# Patient Record
Sex: FEMALE | Race: WHITE | URBAN - METROPOLITAN AREA
[De-identification: names, ages, dates, MRNs, and addresses within clinical notes are randomized per-mention and may not be internally consistent; named-entity substitution may affect disease eponyms.]

---

## 2017-02-14 ENCOUNTER — OFFICE VISIT (OUTPATIENT)
Dept: FAMILY MEDICINE CLINIC | Facility: CLINIC | Age: 57
End: 2017-02-14

## 2017-02-14 VITALS
SYSTOLIC BLOOD PRESSURE: 128 MMHG | RESPIRATION RATE: 22 BRPM | HEART RATE: 89 BPM | DIASTOLIC BLOOD PRESSURE: 74 MMHG | TEMPERATURE: 99 F

## 2017-02-14 DIAGNOSIS — M16.11 OSTEOARTHRITIS OF RIGHT HIP, UNSPECIFIED OSTEOARTHRITIS TYPE: ICD-10-CM

## 2017-02-14 DIAGNOSIS — G20 PARKINSON DISEASE (HCC): ICD-10-CM

## 2017-02-14 PROCEDURE — 99212 OFFICE O/P EST SF 10 MIN: CPT | Performed by: FAMILY MEDICINE

## 2017-02-14 PROCEDURE — 99202 OFFICE O/P NEW SF 15 MIN: CPT | Performed by: FAMILY MEDICINE

## 2017-02-14 RX ORDER — ALPRAZOLAM 0.25 MG/1
TABLET ORAL
Refills: 0 | COMMUNITY
Start: 2016-12-28 | End: 2017-02-14

## 2017-02-14 RX ORDER — CITALOPRAM 40 MG/1
TABLET ORAL
Refills: 2 | COMMUNITY
Start: 2017-01-24 | End: 2017-08-09

## 2017-02-14 RX ORDER — CLONAZEPAM 1 MG/1
1 TABLET ORAL NIGHTLY PRN
Refills: 0 | COMMUNITY
Start: 2016-12-28 | End: 2017-08-09

## 2017-02-14 RX ORDER — ALPRAZOLAM 0.25 MG/1
0.25 TABLET ORAL 2 TIMES DAILY PRN
Qty: 30 TABLET | Refills: 2 | Status: SHIPPED | OUTPATIENT
Start: 2017-02-14 | End: 2017-08-09

## 2017-02-14 RX ORDER — IBUPROFEN 800 MG/1
TABLET ORAL
Refills: 1 | COMMUNITY
Start: 2017-02-06 | End: 2018-08-24

## 2017-02-14 RX ORDER — RASAGILINE 1 MG/1
1 TABLET ORAL
COMMUNITY
End: 2017-08-09

## 2017-02-14 NOTE — PROGRESS NOTES
HPI:    Patient ID: Clarice Oneal is a 64year old female. HPI Comments: Patient is here to establish care. No acute issues but has hx of sig Parkinson's disease and also right hip arthritis. Pt has had sig pains and is on disability now.  Patient is reque encounter. Meds This Visit:  Signed Prescriptions Disp Refills    alprazolam 0.25 MG Oral Tab 30 tablet 2      Sig: Take 1 tablet (0.25 mg total) by mouth 2 (two) times daily as needed for Anxiety.            Imaging & Referrals:  ORTHOPEDIC - [facility]

## 2017-02-21 ENCOUNTER — HOSPITAL ENCOUNTER (OUTPATIENT)
Dept: GENERAL RADIOLOGY | Facility: HOSPITAL | Age: 57
Discharge: HOME | End: 2017-02-21
Attending: ORTHOPAEDIC SURGERY
Payer: COMMERCIAL

## 2017-02-21 ENCOUNTER — OFFICE VISIT (OUTPATIENT)
Dept: ORTHOPEDICS CLINIC | Facility: CLINIC | Age: 57
End: 2017-02-21

## 2017-02-21 DIAGNOSIS — M25.551 RIGHT HIP PAIN: ICD-10-CM

## 2017-02-21 DIAGNOSIS — M51.36 DDD (DEGENERATIVE DISC DISEASE), LUMBAR: ICD-10-CM

## 2017-02-21 DIAGNOSIS — M16.11 PRIMARY OSTEOARTHRITIS OF RIGHT HIP: Primary | ICD-10-CM

## 2017-02-21 PROCEDURE — 73502 X-RAY EXAM HIP UNI 2-3 VIEWS: CPT

## 2017-02-21 PROCEDURE — 72120 X-RAY BEND ONLY L-S SPINE: CPT

## 2017-02-21 PROCEDURE — 99212 OFFICE O/P EST SF 10 MIN: CPT | Performed by: ORTHOPAEDIC SURGERY

## 2017-02-21 PROCEDURE — 99243 OFF/OP CNSLTJ NEW/EST LOW 30: CPT | Performed by: ORTHOPAEDIC SURGERY

## 2017-02-21 RX ORDER — DICLOFENAC SODIUM 75 MG/1
75 TABLET, DELAYED RELEASE ORAL 2 TIMES DAILY PRN
Qty: 60 TABLET | Refills: 0 | Status: SHIPPED | OUTPATIENT
Start: 2017-02-21 | End: 2017-04-04

## 2017-02-22 NOTE — PROGRESS NOTES
2/21/2017  Dane Henry  106/1960  64year old   female  Krishna Lara MD    HPI:   Patient presents with:  Hip Pain: Right - onset 2014 when she started to have pain in her R groin - she has Parkinson disease - she was Dx with osteoarthritis in the R Never Smoker                      Alcohol Use: No                    REVIEW OF SYSTEMS:   A 12 point review of systems was performed as documented on the intake form and reviewed by me today with pertinent positives and negatives listed in the HPI.     EXAM The patient was offered physical therapy for the lumbar spine but she declined. The patient will be given a prescription for Voltaren to use as an anti-inflammatory. Patient will follow-up only as needed.     All of their questions were answered and they

## 2017-04-04 RX ORDER — DICLOFENAC SODIUM 75 MG/1
75 TABLET, DELAYED RELEASE ORAL 2 TIMES DAILY PRN
Qty: 60 TABLET | Refills: 0 | Status: SHIPPED
Start: 2017-04-04 | End: 2017-08-09

## 2017-04-04 NOTE — TELEPHONE ENCOUNTER
From: Rocco Wolff  To: Agus Gill., MD  Sent: 3/30/2017 2:33 PM CDT  Subject: Medication Renewal Request    Original authorizing provider: MD Rocco Sepulveda would like a refill of the following medications:  Diclofenac Sodium 75

## 2017-04-10 ENCOUNTER — TELEPHONE (OUTPATIENT)
Dept: ORTHOPEDICS CLINIC | Facility: CLINIC | Age: 57
End: 2017-04-10

## 2017-06-15 ENCOUNTER — TELEPHONE (OUTPATIENT)
Dept: ORTHOPEDICS CLINIC | Facility: CLINIC | Age: 57
End: 2017-06-15

## 2017-06-15 NOTE — TELEPHONE ENCOUNTER
rc'd fax from pharm requesting refill of diclofenac 75mg BID prn #60. LOV 2/21/17. Last refill 3/30/17.  Please advise

## 2017-06-16 NOTE — TELEPHONE ENCOUNTER
Call to [facility]. No answer. Left detailed message. Explained for longer term usage of antinflamatory Dr. Juan Ramon Burkett preferrrs the monitoring of this to be with the primary care physician. Diclofenac 75mg  REquested call back for any questions.

## 2017-08-07 ENCOUNTER — TELEPHONE (OUTPATIENT)
Dept: NEUROLOGY | Facility: CLINIC | Age: 57
End: 2017-08-07

## 2017-08-09 ENCOUNTER — OFFICE VISIT (OUTPATIENT)
Dept: NEUROLOGY | Facility: CLINIC | Age: 57
End: 2017-08-09

## 2017-08-09 VITALS
BODY MASS INDEX: 36.65 KG/M2 | WEIGHT: 220 LBS | RESPIRATION RATE: 16 BRPM | SYSTOLIC BLOOD PRESSURE: 110 MMHG | DIASTOLIC BLOOD PRESSURE: 74 MMHG | HEIGHT: 65 IN | HEART RATE: 80 BPM

## 2017-08-09 DIAGNOSIS — G20 PARKINSON'S DISEASE (HCC): Primary | ICD-10-CM

## 2017-08-09 PROCEDURE — 99244 OFF/OP CNSLTJ NEW/EST MOD 40: CPT | Performed by: OTHER

## 2017-08-09 RX ORDER — CITALOPRAM 40 MG/1
TABLET ORAL
Qty: 90 TABLET | Refills: 2 | Status: SHIPPED | OUTPATIENT
Start: 2017-08-09 | End: 2018-05-02

## 2017-08-09 RX ORDER — CLONAZEPAM 1 MG/1
0.5 TABLET ORAL NIGHTLY PRN
Qty: 15 TABLET | Refills: 0 | Status: SHIPPED | OUTPATIENT
Start: 2017-08-09 | End: 2017-09-26

## 2017-08-09 RX ORDER — GABAPENTIN 100 MG/1
3 CAPSULE ORAL 3 TIMES DAILY
Refills: 3 | COMMUNITY
Start: 2017-05-22 | End: 2017-10-20

## 2017-08-09 RX ORDER — DICLOFENAC SODIUM 75 MG/1
75 TABLET, DELAYED RELEASE ORAL 2 TIMES DAILY PRN
Qty: 60 TABLET | Refills: 0 | Status: SHIPPED | OUTPATIENT
Start: 2017-08-09 | End: 2017-09-26

## 2017-08-09 RX ORDER — MULTIVIT-MIN/IRON/FOLIC ACID/K 18-600-40
CAPSULE ORAL
COMMUNITY

## 2017-08-17 PROBLEM — G20 PARKINSON'S DISEASE (HCC): Status: ACTIVE | Noted: 2017-08-17

## 2017-08-17 PROBLEM — G20.A1 PARKINSON'S DISEASE: Status: ACTIVE | Noted: 2017-08-17

## 2017-08-17 NOTE — PROGRESS NOTES
Neurology Outpatient Consult Note    Matheus Grider : 1960   Referring Physician: Dr. Ingrid Ku  HPI:     Matheus Grider is a 62year old female who is being seen in neurologic evaluation.     Patient is being seen in evaluation for Parkinson's disease tablet (75 mg total) by mouth 2 (two) times daily as needed.  Disp: 60 tablet Rfl: 0   ibuprofen 800 MG Oral Tab TK ONE T PO BID WITH FOOD Disp:  Rfl: 1      Past Medical History:   Diagnosis Date   • Depression    • Obesity    • Osteoarthritis    • Naeem finger-nose-finger dysmetria, markedly impaired gait which appears to be parkinsonian as well as antalgic due to right hip pain    IMAGING / STUDIES:  reviewed   X-ray lumbar spine  CONCLUSION:             1. Mild osteoarthritis lumbar spine.   2. Advanced

## 2017-09-26 RX ORDER — DICLOFENAC SODIUM 75 MG/1
TABLET, DELAYED RELEASE ORAL
Qty: 60 TABLET | Refills: 0 | Status: SHIPPED | OUTPATIENT
Start: 2017-09-26 | End: 2017-12-06

## 2017-09-26 RX ORDER — CLONAZEPAM 1 MG/1
TABLET ORAL
Qty: 15 TABLET | Refills: 0 | OUTPATIENT
Start: 2017-09-26 | End: 2017-12-06

## 2017-09-26 NOTE — TELEPHONE ENCOUNTER
Refill request for Diclofenac 75 mg take 1 tab as needed, qt:60 0 refills  Clonazepam 1 mg take 1/2 tab nightly as needed, qt:15 0 refills    LOV: 8/9/17  NOV: None  Last refill: 8/9/17 30 day supply

## 2017-10-20 ENCOUNTER — PATIENT MESSAGE (OUTPATIENT)
Dept: NEUROLOGY | Facility: CLINIC | Age: 57
End: 2017-10-20

## 2017-10-20 RX ORDER — GABAPENTIN 100 MG/1
100 CAPSULE ORAL 3 TIMES DAILY
Qty: 90 CAPSULE | Refills: 3 | Status: SHIPPED | OUTPATIENT
Start: 2017-10-20 | End: 2018-03-14

## 2017-10-20 NOTE — TELEPHONE ENCOUNTER
From: Brittany Lundy  To: Scott Gonzalez MD  Sent: 10/20/2017 11:43 AM CDT  Subject: Prescription Question    Osbaldo BRAND    Can I please get a higher dose of Carbidopa Levodopa? my current dose is not working well for me.       Can I also get a prescription for Amrit

## 2017-10-25 ENCOUNTER — PATIENT MESSAGE (OUTPATIENT)
Dept: NEUROLOGY | Facility: CLINIC | Age: 57
End: 2017-10-25

## 2017-10-25 NOTE — TELEPHONE ENCOUNTER
From: Madhav León  To: Sameer Emery MD  Sent: 10/25/2017 12:52 PM CDT  Subject: Prescription Question    Hi Dr. Prajapati Heading I please get back on Rytary 36.25/ 145    3 tablet 3 times daily? Can you please send a  Prescription to the pharmacy.   Thank you  Michela Albert

## 2017-10-26 ENCOUNTER — TELEPHONE (OUTPATIENT)
Dept: NEUROLOGY | Facility: CLINIC | Age: 57
End: 2017-10-26

## 2017-10-30 ENCOUNTER — TELEPHONE (OUTPATIENT)
Dept: NEUROLOGY | Facility: CLINIC | Age: 57
End: 2017-10-30

## 2017-10-31 ENCOUNTER — MED REC SCAN ONLY (OUTPATIENT)
Dept: NEUROLOGY | Facility: CLINIC | Age: 57
End: 2017-10-31

## 2017-10-31 ENCOUNTER — TELEPHONE (OUTPATIENT)
Dept: NEUROLOGY | Facility: CLINIC | Age: 57
End: 2017-10-31

## 2017-10-31 NOTE — TELEPHONE ENCOUNTER
PA for rytary ER 36. mg completed through [employer]. Determination will be faxed to office in 24-72 hrs.      Confirmation # [id number]

## 2017-11-06 ENCOUNTER — PATIENT MESSAGE (OUTPATIENT)
Dept: NEUROLOGY | Facility: CLINIC | Age: 57
End: 2017-11-06

## 2017-11-06 NOTE — TELEPHONE ENCOUNTER
From: Kasey Muller  To: Trey Kayser, MD  Sent: 11/6/2017 10:54 AM CST  Subject: Prescription Question    Hi Dr. Janine Nava morning, I am so sorry to bother you, I just wanted to check with you to see if you have received the paperwork from [place].  They said

## 2017-11-14 ENCOUNTER — PATIENT MESSAGE (OUTPATIENT)
Dept: NEUROLOGY | Facility: CLINIC | Age: 57
End: 2017-11-14

## 2017-11-15 NOTE — TELEPHONE ENCOUNTER
From: Catherine Santo  To: Balwinder Malin MD  Sent: 11/14/2017 8:00 PM CST  Subject: Prescription Question    Hi   How are you? I have had to start taking, the Carbidopa Levodopa  again.  My insurance did approve the Rytary, but I could not afford the

## 2017-11-20 ENCOUNTER — PATIENT MESSAGE (OUTPATIENT)
Dept: NEUROLOGY | Facility: CLINIC | Age: 57
End: 2017-11-20

## 2017-11-20 NOTE — TELEPHONE ENCOUNTER
From: Rocco Wolff  To:  Matthew Lopez MD  Sent: 11/20/2017 1:30 PM CST  Subject: Prescription Question    Hi Dr. Leopoldo Delatorre you please send a new prescription to [facility], for my new dosage of Carbidopa Levodopa?  Mg   I am taking 2-1/2 tablets 6 times da

## 2017-12-06 ENCOUNTER — TELEPHONE (OUTPATIENT)
Dept: NEUROLOGY | Facility: CLINIC | Age: 57
End: 2017-12-06

## 2017-12-06 RX ORDER — CLONAZEPAM 1 MG/1
TABLET ORAL
Qty: 15 TABLET | Refills: 0 | Status: CANCELLED
Start: 2017-12-06

## 2017-12-06 RX ORDER — CLONAZEPAM 1 MG/1
TABLET ORAL
Qty: 15 TABLET | Refills: 1 | OUTPATIENT
Start: 2017-12-06 | End: 2018-03-14

## 2017-12-06 RX ORDER — DICLOFENAC SODIUM 75 MG/1
TABLET, DELAYED RELEASE ORAL
Qty: 60 TABLET | Refills: 1 | Status: SHIPPED | OUTPATIENT
Start: 2017-12-06 | End: 2018-03-14

## 2017-12-06 RX ORDER — DICLOFENAC SODIUM 75 MG/1
TABLET, DELAYED RELEASE ORAL
Qty: 60 TABLET | Refills: 0 | Status: CANCELLED
Start: 2017-12-06

## 2017-12-06 NOTE — TELEPHONE ENCOUNTER
Refill request for Diclofenac 75 mg take 1 tab BID prn, qt:60 1 refill    Clonazepam 1 mg take 1/2 tab nightly prn, qt:15 1 refill    LOV: 8/9/17  NOV: 12/18/17  Last refill: 9/26/17

## 2017-12-06 NOTE — TELEPHONE ENCOUNTER
From: Raphael Almodovar  Sent: 12/6/2017 7:59 AM CST  Subject: Medication Renewal Request    Raphael Almodovar would like a refill of the following medications:     DICLOFENAC SODIUM 75 MG Oral Tab EC Ritika Mascorro MD]     CLONAZEPAM 1 MG Oral Tab Ritika Mascorro MD]

## 2017-12-07 ENCOUNTER — TELEPHONE (OUTPATIENT)
Dept: NEUROLOGY | Facility: CLINIC | Age: 57
End: 2017-12-07

## 2017-12-07 RX ORDER — CLONAZEPAM 1 MG/1
TABLET ORAL
Qty: 15 TABLET | Refills: 1 | Status: CANCELLED
Start: 2017-12-07

## 2017-12-07 NOTE — TELEPHONE ENCOUNTER
From: Kitty Castellanos  Sent: 12/7/2017 10:51 AM CST  Subject: Medication Renewal Request    Kitty Castellanos would like a refill of the following medications:     ClonazePAM 1 MG Oral Tab Daniel Garza MD]    Preferred pharmacy: [address]

## 2017-12-11 ENCOUNTER — MED REC SCAN ONLY (OUTPATIENT)
Dept: NEUROLOGY | Facility: CLINIC | Age: 57
End: 2017-12-11

## 2017-12-18 ENCOUNTER — OFFICE VISIT (OUTPATIENT)
Dept: ORTHOPEDICS CLINIC | Facility: CLINIC | Age: 57
End: 2017-12-18

## 2017-12-18 ENCOUNTER — OFFICE VISIT (OUTPATIENT)
Dept: NEUROLOGY | Facility: CLINIC | Age: 57
End: 2017-12-18

## 2017-12-18 VITALS
HEIGHT: 65 IN | RESPIRATION RATE: 16 BRPM | DIASTOLIC BLOOD PRESSURE: 84 MMHG | WEIGHT: 200 LBS | SYSTOLIC BLOOD PRESSURE: 142 MMHG | BODY MASS INDEX: 33.32 KG/M2 | HEART RATE: 72 BPM

## 2017-12-18 DIAGNOSIS — G20 PARKINSON'S DISEASE (HCC): Primary | ICD-10-CM

## 2017-12-18 DIAGNOSIS — G20 PARKINSON'S DISEASE (HCC): ICD-10-CM

## 2017-12-18 DIAGNOSIS — M16.11 PRIMARY OSTEOARTHRITIS OF RIGHT HIP: Primary | ICD-10-CM

## 2017-12-18 PROCEDURE — 99213 OFFICE O/P EST LOW 20 MIN: CPT | Performed by: ORTHOPAEDIC SURGERY

## 2017-12-18 PROCEDURE — 99212 OFFICE O/P EST SF 10 MIN: CPT | Performed by: ORTHOPAEDIC SURGERY

## 2017-12-18 PROCEDURE — 99214 OFFICE O/P EST MOD 30 MIN: CPT | Performed by: OTHER

## 2017-12-18 NOTE — PROGRESS NOTES
Patient is a 66-year-old female who has Parkinson's disease and is known right hip arthritis for several years.   She was originally seen at [facility] and they discussed doing a hip replacement specifically they discuss possibly an anterior approach according with some pelvic rock. When she flexes her spine she does not describe significant low back pain and she can dorsiflex and plantarflex against resistance.   She has some Parkinson's-like findings and she states she gets some spasms intermittently    X-rays

## 2017-12-18 NOTE — PROGRESS NOTES
[facility]    Kevin Calles is a 62year old female. HPI:     Patient is being seen in follow-up. I saw her in clinic last 8/9/2017. She is accompanied by her son to the visit today.     Since last visit, she has been switched HPI      PHYSICAL EXAM:     Vitals:  /84 (BP Location: Left arm, Patient Position: Sitting, Cuff Size: adult)   Pulse 72   Resp 16   Ht 65\"   Wt 200 lb   BMI 33.28 kg/m²    General: no apparent distress, pleasant and cooperative   Neuro:  Mental Sta

## 2017-12-20 ENCOUNTER — PATIENT MESSAGE (OUTPATIENT)
Dept: FAMILY MEDICINE CLINIC | Facility: CLINIC | Age: 57
End: 2017-12-20

## 2017-12-21 RX ORDER — TRAMADOL HYDROCHLORIDE 50 MG/1
50 TABLET ORAL EVERY 6 HOURS PRN
Qty: 45 TABLET | Refills: 0 | OUTPATIENT
Start: 2017-12-21

## 2017-12-21 NOTE — TELEPHONE ENCOUNTER
From: Samantha Herny  To: Vin Lane MD  Sent: 12/20/2017 7:48 PM CST  Subject: Prescription Question    Hi Dr. John Hickman,  Would you be able to prescribe something for my hip pain? Please let me know if it is possible.   Thank you  Samantha Henry

## 2017-12-21 NOTE — TELEPHONE ENCOUNTER
Please see pt MyChart message / advise.   Thank you  LOV 2/14/17  Just saw ortho 12/18/17 and advised hip replacement surgery

## 2017-12-21 NOTE — TELEPHONE ENCOUNTER
Message noted. May start tramadol as requested for hip pain. Erx signed and please call into pharmacy as this is controlled medication.  To follow up for appointment if not better; Please notify patient

## 2018-03-14 RX ORDER — GABAPENTIN 100 MG/1
100 CAPSULE ORAL 3 TIMES DAILY
Qty: 90 CAPSULE | Refills: 3 | Status: SHIPPED
Start: 2018-03-14 | End: 2018-08-24

## 2018-03-14 RX ORDER — DICLOFENAC SODIUM 75 MG/1
TABLET, DELAYED RELEASE ORAL
Qty: 60 TABLET | Refills: 3 | Status: SHIPPED
Start: 2018-03-14 | End: 2018-09-19

## 2018-03-14 RX ORDER — CLONAZEPAM 1 MG/1
TABLET ORAL
Qty: 15 TABLET | Refills: 3 | OUTPATIENT
Start: 2018-03-14 | End: 2018-06-22

## 2018-03-14 NOTE — TELEPHONE ENCOUNTER
From: Marianna Wilson  Sent: 3/14/2018 8:53 AM CDT  Subject: Medication Renewal Request    Marianna Wilson would like a refill of the following medications:     gabapentin 100 MG Oral Cap Jie Garcia MD]     carbidopa-levodopa  MG Oral Tab Jie Garcia,

## 2018-03-14 NOTE — TELEPHONE ENCOUNTER
Refill request for gabapentin 100 mg, TID, #90, 3 refills  Refill request for sinemet  mg, take 2.5 tabs every 6 hrs, #450, 3 refill  Refill request for diclofenac 75 mg, BID PRN, #60, 3 refills   Refill request for clonazepam 1 mg, take 1/2 tab nigh

## 2018-04-08 RX ORDER — TRAMADOL HYDROCHLORIDE 50 MG/1
50 TABLET ORAL EVERY 6 HOURS PRN
Qty: 45 TABLET | Refills: 0
Start: 2018-04-08

## 2018-04-09 NOTE — TELEPHONE ENCOUNTER
From: Kenyon Maldonado  Sent: 4/8/2018 12:20 PM CDT  Subject: Medication Renewal Request    Kenyon Maldonado would like a refill of the following medications:     TraMADol HCl 50 MG Oral Tab Louie Schmidt MD]    Preferred pharmacy: [facility] [address] [facility] [address] [facility], [phone], [phone]

## 2018-04-09 NOTE — TELEPHONE ENCOUNTER
Noted when Dr Laura Curry prescribed this on 12/21/17 (see 12/20/17 encounter) he stated if not better pt needed to schedule OV. Message sent to pt on [employer].

## 2018-05-02 RX ORDER — CITALOPRAM 40 MG/1
TABLET ORAL
Qty: 90 TABLET | Refills: 1 | Status: SHIPPED | OUTPATIENT
Start: 2018-05-02

## 2018-05-02 NOTE — TELEPHONE ENCOUNTER
From: Ethelda Fabry  Sent: 5/2/2018 10:41 AM CDT  Subject: Medication Renewal Request    Ethelda Fabry would like a refill of the following medications:     Citalopram Hydrobromide 40 MG Oral Tab Sin Falcon MD]    Preferred pharmacy: [place] [id number]

## 2018-06-22 ENCOUNTER — PATIENT MESSAGE (OUTPATIENT)
Dept: NEUROLOGY | Facility: CLINIC | Age: 58
End: 2018-06-22

## 2018-06-22 DIAGNOSIS — G20 PARKINSON'S DISEASE (HCC): Primary | ICD-10-CM

## 2018-06-22 RX ORDER — CLONAZEPAM 1 MG/1
TABLET ORAL
Qty: 30 TABLET | Refills: 3 | OUTPATIENT
Start: 2018-06-22 | End: 2018-09-19

## 2018-06-22 RX ORDER — CLONAZEPAM 1 MG/1
TABLET ORAL
Qty: 15 TABLET | Refills: 3 | OUTPATIENT
Start: 2018-06-22 | End: 2018-06-22

## 2018-06-22 NOTE — TELEPHONE ENCOUNTER
From: Kevin Calles  To: Nataly Dick MD  Sent: 6/22/2018 10:40 AM CDT  Subject: Prescription Question    Hi DrRoselia  How are you? Would you be able to increase the Clonzpam from half a pill, to one pill daily as needed? Half a pill is not working for me.  Can

## 2018-08-24 ENCOUNTER — PATIENT MESSAGE (OUTPATIENT)
Dept: NEUROLOGY | Facility: CLINIC | Age: 58
End: 2018-08-24

## 2018-08-24 DIAGNOSIS — G20 PARKINSON'S DISEASE (HCC): Primary | ICD-10-CM

## 2018-08-24 DIAGNOSIS — Z12.31 VISIT FOR SCREENING MAMMOGRAM: ICD-10-CM

## 2018-08-24 RX ORDER — GABAPENTIN 300 MG/1
300 CAPSULE ORAL 3 TIMES DAILY
Qty: 270 CAPSULE | Refills: 2 | Status: SHIPPED | OUTPATIENT
Start: 2018-08-24

## 2018-08-24 RX ORDER — IBUPROFEN 800 MG/1
800 TABLET ORAL EVERY 8 HOURS PRN
Qty: 30 TABLET | Refills: 1 | Status: SHIPPED | OUTPATIENT
Start: 2018-08-24

## 2018-09-12 ENCOUNTER — TELEPHONE (OUTPATIENT)
Dept: NEUROLOGY | Facility: CLINIC | Age: 58
End: 2018-09-12

## 2018-09-12 NOTE — TELEPHONE ENCOUNTER
Pt called asking if Dr Chikis Mcgrath could admit her because she could no longer care for herself at home. Pt stated she can no longer walk up or down stairs. Pt stated that she has bed sore on sacral area but can not see how extensive.  Pt stated that she needs

## 2018-09-19 ENCOUNTER — TELEPHONE (OUTPATIENT)
Dept: NEUROLOGY | Facility: CLINIC | Age: 58
End: 2018-09-19

## 2018-09-19 NOTE — TELEPHONE ENCOUNTER
Medication request: ClonazePAM 1 MG Oral Tab, #30, 3 refills  Take 1 tablet by mouth every night as needed for insomnia.      [place]    Medication request: Diclofenac Sodium 75 MG Oral Tab EC, #60, 3 refills  Take 1 tablet (75 mg) by mouth twice

## 2018-09-20 RX ORDER — CLONAZEPAM 1 MG/1
TABLET ORAL
Qty: 30 TABLET | Refills: 3 | OUTPATIENT
Start: 2018-09-20

## 2018-09-20 RX ORDER — DICLOFENAC SODIUM 75 MG/1
TABLET, DELAYED RELEASE ORAL
Qty: 60 TABLET | Refills: 3 | Status: SHIPPED | OUTPATIENT
Start: 2018-09-20

## 2018-09-20 NOTE — TELEPHONE ENCOUNTER
Spoke to patient and informed her there are refills on her Clonazepam at her pharmacy and that the Sinemet and Diclofenac has been sent to her pharmacy.  Patient vebalized understanding

## 2018-09-20 NOTE — TELEPHONE ENCOUNTER
Pt calling stating she is returning a missed call. States there was no message/didn't listen to one if there was one.   Would like a call back

## 2018-09-28 ENCOUNTER — PATIENT MESSAGE (OUTPATIENT)
Dept: NEUROLOGY | Facility: CLINIC | Age: 58
End: 2018-09-28

## 2018-10-01 NOTE — TELEPHONE ENCOUNTER
From: Link Sable  To: Cristal Horton MD  Sent: 9/28/2018 2:49 PM CDT  Subject: Other    Hi Dr. Avalos Standing,  How are you?   The reason for this message is that I am going to have my hip replacement surgery, it's going to be performed at [facility]. Do y

## (undated) NOTE — MR AVS SNAPSHOT
[facility] SPECIALTY [facility] - [place] [address] [address]  [id number] [phone]               Thank you for choosing us for your health care visit with Kareem Robledo MD.  We are glad to serve you and happy to provide you with this summary of y - alprazolam 0.25 MG Tabs            Today's Orders     Ortho Referral - [facility])    Complete by:  As directed    Assoc Dx:  Osteoarthritis of right hip, unspecified osteoarthritis type [M16.11]           Neuro Referral - [facility] [address]  [phone]      [address], [facility] [address], [address] [facility], [address] [place] [address] Assoc Dx:  Osteoarthritis of right hip, unspecified osteoarthritis type [M16.11]          [employer]     Sign up for [employer], your secure online medical record.   [employer] will allow you to access patient instructions from your recent visit,  view other besomebody. Get your heart pumping – brisk walking, biking, swimming Even 10 minute increments are effective and add up over the week   2 ½ hours per week – spread out over time Use a haylee to keep you motivated   Don’t forget strength training with weights and resist

## (undated) NOTE — MR AVS SNAPSHOT
[place]  [address]  [phone]               Thank you for choosing us for your health care visit with Alin Cohen MD.  We are glad to serve you and happy to provide you with this goins insurance company's guidelines for prior authorization for this test.  You may be held responsible for payment in full if proper authorization is not acquired.   Please contact the Patient Business Office at [phone] if you have any questions related to Take 1 tablet (0.25 mg total) by mouth 2 (two) times daily as needed for Anxiety. Commonly known as:  XANAX           AZILECT 1 MG Tabs   Generic drug:  Rasagiline Mesylate   Take 1 mg by mouth.            Citalopram Hydrobromide 40 MG Tabs   TK 1 T PO QD

## (undated) NOTE — LETTER
17          Sarah Vargas  :  1960      To Whom It May Concern: This patient was seen in our office on 17. Patient was diagnosed with Parkinson's Disease in .   Due to progressive decline in condition, Ms. Jessica Kiran has been unable to

## (undated) NOTE — LETTER
17          Brittany Lundy  :  1960      To Whom It May Concern: This patient was seen in our office on 17.   Due to patient's diagnosis of Parkinson's disease, she has significant impairment in fine motor tasks, coordination, and walking